# Patient Record
Sex: FEMALE | Race: WHITE | Employment: OTHER | URBAN - NONMETROPOLITAN AREA
[De-identification: names, ages, dates, MRNs, and addresses within clinical notes are randomized per-mention and may not be internally consistent; named-entity substitution may affect disease eponyms.]

---

## 2020-11-03 RX ORDER — LEVOTHYROXINE SODIUM 0.05 MG/1
50 TABLET ORAL DAILY
COMMUNITY

## 2020-11-03 RX ORDER — MELOXICAM 7.5 MG/1
7.5 TABLET ORAL DAILY
COMMUNITY

## 2020-11-03 RX ORDER — CILOSTAZOL 50 MG/1
50 TABLET ORAL DAILY
COMMUNITY

## 2020-11-03 RX ORDER — ALPRAZOLAM 0.25 MG/1
0.25 TABLET ORAL NIGHTLY PRN
COMMUNITY

## 2020-11-03 RX ORDER — CEPHALEXIN 500 MG/1
500 CAPSULE ORAL 4 TIMES DAILY
COMMUNITY

## 2020-11-03 RX ORDER — CARVEDILOL 6.25 MG/1
6.25 TABLET ORAL 2 TIMES DAILY WITH MEALS
COMMUNITY

## 2020-11-03 RX ORDER — AMLODIPINE BESYLATE 10 MG/1
10 TABLET ORAL DAILY
COMMUNITY

## 2020-11-03 RX ORDER — DOCUSATE SODIUM 100 MG/1
100 CAPSULE, LIQUID FILLED ORAL NIGHTLY
COMMUNITY

## 2020-11-03 RX ORDER — GABAPENTIN 100 MG/1
100 CAPSULE ORAL 3 TIMES DAILY
COMMUNITY

## 2020-11-03 RX ORDER — ATORVASTATIN CALCIUM 80 MG/1
80 TABLET, FILM COATED ORAL NIGHTLY
COMMUNITY

## 2020-11-03 RX ORDER — OXYCODONE HYDROCHLORIDE AND ACETAMINOPHEN 5; 325 MG/1; MG/1
1 TABLET ORAL EVERY 6 HOURS PRN
Status: ON HOLD | COMMUNITY
End: 2020-11-18 | Stop reason: HOSPADM

## 2020-11-03 RX ORDER — MULTIVIT WITH MINERALS/LUTEIN
1000 TABLET ORAL DAILY
COMMUNITY

## 2020-11-03 RX ORDER — ONDANSETRON 4 MG/1
4 TABLET, FILM COATED ORAL EVERY 8 HOURS PRN
COMMUNITY

## 2020-11-03 RX ORDER — CILOSTAZOL 50 MG/1
50 TABLET ORAL DAILY
COMMUNITY
End: 2020-11-03 | Stop reason: ALTCHOICE

## 2020-11-04 NOTE — PROGRESS NOTES
Message received from [place] at Dr. Raad Mendoza office to have the pr hold Pletal x 3 days prior to surgery per Dr. Korey Roldan. Instructions faxed to [facility].

## 2020-11-10 ENCOUNTER — HOSPITAL ENCOUNTER (OUTPATIENT)
Dept: PREADMISSION TESTING | Age: 85
Setting detail: SPECIMEN
Discharge: HOME | End: 2020-11-14
Payer: MEDICARE

## 2020-11-10 PROCEDURE — C9803 HOPD COVID-19 SPEC COLLECT: HCPCS

## 2020-11-10 PROCEDURE — U0003 INFECTIOUS AGENT DETECTION BY NUCLEIC ACID (DNA OR RNA); SEVERE ACUTE RESPIRATORY SYNDROME CORONAVIRUS 2 (SARS-COV-2) (CORONAVIRUS DISEASE [COVID-19]), AMPLIFIED PROBE TECHNIQUE, MAKING USE OF HIGH THROUGHPUT TECHNOLOGIES AS DESCRIBED BY CMS-2020-01-R: HCPCS

## 2020-11-11 LAB
SARS-COV-2, RAPID: NORMAL
SARS-COV-2: NORMAL
SARS-COV-2: NOT DETECTED
SOURCE: NORMAL

## 2020-11-16 ENCOUNTER — ANESTHESIA EVENT (OUTPATIENT)
Dept: OPERATING ROOM | Age: 85
DRG: 470 | End: 2020-11-16
Payer: MEDICARE

## 2020-11-17 ENCOUNTER — APPOINTMENT (OUTPATIENT)
Dept: GENERAL RADIOLOGY | Age: 85
DRG: 470 | End: 2020-11-17
Attending: ORTHOPAEDIC SURGERY
Payer: MEDICARE

## 2020-11-17 ENCOUNTER — ANESTHESIA (OUTPATIENT)
Dept: OPERATING ROOM | Age: 85
DRG: 470 | End: 2020-11-17
Payer: MEDICARE

## 2020-11-17 ENCOUNTER — HOSPITAL ENCOUNTER (INPATIENT)
Age: 85
LOS: 1 days | Discharge: NURSING, REHAB OR LONG-TERM CARE | DRG: 470 | End: 2020-11-18
Attending: ORTHOPAEDIC SURGERY | Admitting: ORTHOPAEDIC SURGERY
Payer: MEDICARE

## 2020-11-17 VITALS — DIASTOLIC BLOOD PRESSURE: 56 MMHG | SYSTOLIC BLOOD PRESSURE: 146 MMHG | OXYGEN SATURATION: 99 %

## 2020-11-17 PROBLEM — M16.11 PRIMARY OSTEOARTHRITIS OF RIGHT HIP: Status: ACTIVE | Noted: 2020-11-17

## 2020-11-17 PROCEDURE — 3600000015 HC SURGERY LEVEL 5 ADDTL 15MIN: Performed by: ORTHOPAEDIC SURGERY

## 2020-11-17 PROCEDURE — 3700000001 HC ADD 15 MINUTES (ANESTHESIA): Performed by: ORTHOPAEDIC SURGERY

## 2020-11-17 PROCEDURE — 2580000003 HC RX 258: Performed by: ORTHOPAEDIC SURGERY

## 2020-11-17 PROCEDURE — 88311 DECALCIFY TISSUE: CPT

## 2020-11-17 PROCEDURE — 97166 OT EVAL MOD COMPLEX 45 MIN: CPT

## 2020-11-17 PROCEDURE — 0SR90JZ REPLACEMENT OF RIGHT HIP JOINT WITH SYNTHETIC SUBSTITUTE, OPEN APPROACH: ICD-10-PCS | Performed by: ORTHOPAEDIC SURGERY

## 2020-11-17 PROCEDURE — 2709999900 HC NON-CHARGEABLE SUPPLY: Performed by: ORTHOPAEDIC SURGERY

## 2020-11-17 PROCEDURE — 2500000003 HC RX 250 WO HCPCS: Performed by: NURSE ANESTHETIST, CERTIFIED REGISTERED

## 2020-11-17 PROCEDURE — 97162 PT EVAL MOD COMPLEX 30 MIN: CPT

## 2020-11-17 PROCEDURE — 2500000003 HC RX 250 WO HCPCS: Performed by: ORTHOPAEDIC SURGERY

## 2020-11-17 PROCEDURE — 1200000000 HC SEMI PRIVATE

## 2020-11-17 PROCEDURE — 6360000002 HC RX W HCPCS: Performed by: NURSE ANESTHETIST, CERTIFIED REGISTERED

## 2020-11-17 PROCEDURE — 97530 THERAPEUTIC ACTIVITIES: CPT

## 2020-11-17 PROCEDURE — 6360000002 HC RX W HCPCS: Performed by: ORTHOPAEDIC SURGERY

## 2020-11-17 PROCEDURE — 3700000000 HC ANESTHESIA ATTENDED CARE: Performed by: ORTHOPAEDIC SURGERY

## 2020-11-17 PROCEDURE — 6370000000 HC RX 637 (ALT 250 FOR IP): Performed by: ORTHOPAEDIC SURGERY

## 2020-11-17 PROCEDURE — P9046 ALBUMIN (HUMAN), 25%, 20 ML: HCPCS | Performed by: NURSE ANESTHETIST, CERTIFIED REGISTERED

## 2020-11-17 PROCEDURE — 3600000005 HC SURGERY LEVEL 5 BASE: Performed by: ORTHOPAEDIC SURGERY

## 2020-11-17 PROCEDURE — 7100000001 HC PACU RECOVERY - ADDTL 15 MIN: Performed by: ORTHOPAEDIC SURGERY

## 2020-11-17 PROCEDURE — 7100000000 HC PACU RECOVERY - FIRST 15 MIN: Performed by: ORTHOPAEDIC SURGERY

## 2020-11-17 PROCEDURE — 88304 TISSUE EXAM BY PATHOLOGIST: CPT

## 2020-11-17 PROCEDURE — C1776 JOINT DEVICE (IMPLANTABLE): HCPCS | Performed by: ORTHOPAEDIC SURGERY

## 2020-11-17 PROCEDURE — 73501 X-RAY EXAM HIP UNI 1 VIEW: CPT

## 2020-11-17 DEVICE — ACETABULAR SHELL Ø52 TWO HOLES
Type: IMPLANTABLE DEVICE | Site: HIP | Status: FUNCTIONAL
Brand: MPACT ACETABULAR SYSTEM

## 2020-11-17 DEVICE — FEMORAL HEAD Ø 36 SIZE M
Type: IMPLANTABLE DEVICE | Site: HIP | Status: FUNCTIONAL
Brand: COCR FEMORAL BALL HEAD

## 2020-11-17 DEVICE — MASTERLOC CEMENTLESS TI COATED LAT STEM # 6
Type: IMPLANTABLE DEVICE | Site: HIP | Status: FUNCTIONAL
Brand: MASTERLOC FEMORAL STEMS

## 2020-11-17 DEVICE — FLAT PE  HC LINER Ø 36 / E
Type: IMPLANTABLE DEVICE | Site: HIP | Status: FUNCTIONAL
Brand: MPACT ACETABULAR SYSTEM

## 2020-11-17 RX ORDER — CILOSTAZOL 50 MG/1
50 TABLET ORAL DAILY
Status: DISCONTINUED | OUTPATIENT
Start: 2020-11-18 | End: 2020-11-18 | Stop reason: HOSPADM

## 2020-11-17 RX ORDER — POTASSIUM CHLORIDE 750 MG/1
20 TABLET, EXTENDED RELEASE ORAL 2 TIMES DAILY
Status: DISCONTINUED | OUTPATIENT
Start: 2020-11-17 | End: 2020-11-18 | Stop reason: HOSPADM

## 2020-11-17 RX ORDER — LEVOTHYROXINE SODIUM 0.05 MG/1
50 TABLET ORAL DAILY
Status: DISCONTINUED | OUTPATIENT
Start: 2020-11-18 | End: 2020-11-18 | Stop reason: HOSPADM

## 2020-11-17 RX ORDER — CARVEDILOL 6.25 MG/1
6.25 TABLET ORAL 2 TIMES DAILY WITH MEALS
Status: DISCONTINUED | OUTPATIENT
Start: 2020-11-17 | End: 2020-11-18 | Stop reason: HOSPADM

## 2020-11-17 RX ORDER — CEFAZOLIN SODIUM 1 G/3ML
INJECTION, POWDER, FOR SOLUTION INTRAMUSCULAR; INTRAVENOUS PRN
Status: DISCONTINUED | OUTPATIENT
Start: 2020-11-17 | End: 2020-11-17 | Stop reason: ALTCHOICE

## 2020-11-17 RX ORDER — ACETAMINOPHEN 325 MG/1
650 TABLET ORAL EVERY 6 HOURS
Status: DISCONTINUED | OUTPATIENT
Start: 2020-11-17 | End: 2020-11-18 | Stop reason: HOSPADM

## 2020-11-17 RX ORDER — ONDANSETRON 2 MG/ML
INJECTION INTRAMUSCULAR; INTRAVENOUS PRN
Status: DISCONTINUED | OUTPATIENT
Start: 2020-11-17 | End: 2020-11-17 | Stop reason: SDUPTHER

## 2020-11-17 RX ORDER — FENTANYL CITRATE 50 UG/ML
INJECTION, SOLUTION INTRAMUSCULAR; INTRAVENOUS PRN
Status: DISCONTINUED | OUTPATIENT
Start: 2020-11-17 | End: 2020-11-17 | Stop reason: SDUPTHER

## 2020-11-17 RX ORDER — PROPOFOL 10 MG/ML
INJECTION, EMULSION INTRAVENOUS PRN
Status: DISCONTINUED | OUTPATIENT
Start: 2020-11-17 | End: 2020-11-17 | Stop reason: SDUPTHER

## 2020-11-17 RX ORDER — SODIUM CHLORIDE 0.9 % (FLUSH) 0.9 %
10 SYRINGE (ML) INJECTION EVERY 12 HOURS SCHEDULED
Status: DISCONTINUED | OUTPATIENT
Start: 2020-11-17 | End: 2020-11-18 | Stop reason: HOSPADM

## 2020-11-17 RX ORDER — SODIUM CHLORIDE 0.9 % (FLUSH) 0.9 %
10 SYRINGE (ML) INJECTION EVERY 12 HOURS SCHEDULED
Status: DISCONTINUED | OUTPATIENT
Start: 2020-11-17 | End: 2020-11-17 | Stop reason: HOSPADM

## 2020-11-17 RX ORDER — MORPHINE SULFATE 2 MG/ML
2 INJECTION, SOLUTION INTRAMUSCULAR; INTRAVENOUS
Status: DISCONTINUED | OUTPATIENT
Start: 2020-11-17 | End: 2020-11-18 | Stop reason: HOSPADM

## 2020-11-17 RX ORDER — GABAPENTIN 100 MG/1
100 CAPSULE ORAL 3 TIMES DAILY
Status: DISCONTINUED | OUTPATIENT
Start: 2020-11-17 | End: 2020-11-18 | Stop reason: HOSPADM

## 2020-11-17 RX ORDER — BUPIVACAINE/KETOROLAC/KETAMINE 150-60/50
SYRINGE (ML) INJECTION PRN
Status: DISCONTINUED | OUTPATIENT
Start: 2020-11-17 | End: 2020-11-17 | Stop reason: ALTCHOICE

## 2020-11-17 RX ORDER — CLINDAMYCIN PHOSPHATE 900 MG/50ML
900 INJECTION INTRAVENOUS EVERY 8 HOURS
Status: COMPLETED | OUTPATIENT
Start: 2020-11-17 | End: 2020-11-18

## 2020-11-17 RX ORDER — DIMENHYDRINATE 50 MG/1
50 TABLET ORAL ONCE
Status: COMPLETED | OUTPATIENT
Start: 2020-11-17 | End: 2020-11-17

## 2020-11-17 RX ORDER — DOCUSATE SODIUM 100 MG/1
100 CAPSULE, LIQUID FILLED ORAL NIGHTLY
Status: DISCONTINUED | OUTPATIENT
Start: 2020-11-17 | End: 2020-11-18 | Stop reason: HOSPADM

## 2020-11-17 RX ORDER — MELOXICAM 7.5 MG/1
7.5 TABLET ORAL DAILY
Status: DISCONTINUED | OUTPATIENT
Start: 2020-11-18 | End: 2020-11-18 | Stop reason: HOSPADM

## 2020-11-17 RX ORDER — ONDANSETRON 2 MG/ML
4 INJECTION INTRAMUSCULAR; INTRAVENOUS
Status: DISCONTINUED | OUTPATIENT
Start: 2020-11-17 | End: 2020-11-17 | Stop reason: HOSPADM

## 2020-11-17 RX ORDER — CLINDAMYCIN PHOSPHATE 900 MG/50ML
900 INJECTION INTRAVENOUS ONCE
Status: COMPLETED | OUTPATIENT
Start: 2020-11-17 | End: 2020-11-17

## 2020-11-17 RX ORDER — SODIUM CHLORIDE 9 MG/ML
INJECTION, SOLUTION INTRAVENOUS CONTINUOUS
Status: DISCONTINUED | OUTPATIENT
Start: 2020-11-17 | End: 2020-11-18 | Stop reason: HOSPADM

## 2020-11-17 RX ORDER — SODIUM CHLORIDE 0.9 % (FLUSH) 0.9 %
10 SYRINGE (ML) INJECTION PRN
Status: DISCONTINUED | OUTPATIENT
Start: 2020-11-17 | End: 2020-11-17 | Stop reason: HOSPADM

## 2020-11-17 RX ORDER — SODIUM CHLORIDE, SODIUM LACTATE, POTASSIUM CHLORIDE, CALCIUM CHLORIDE 600; 310; 30; 20 MG/100ML; MG/100ML; MG/100ML; MG/100ML
INJECTION, SOLUTION INTRAVENOUS CONTINUOUS
Status: DISCONTINUED | OUTPATIENT
Start: 2020-11-17 | End: 2020-11-17 | Stop reason: SDUPTHER

## 2020-11-17 RX ORDER — ALPRAZOLAM 0.25 MG/1
0.25 TABLET ORAL NIGHTLY PRN
Status: DISCONTINUED | OUTPATIENT
Start: 2020-11-17 | End: 2020-11-18 | Stop reason: HOSPADM

## 2020-11-17 RX ORDER — AMLODIPINE BESYLATE 10 MG/1
10 TABLET ORAL DAILY
Status: DISCONTINUED | OUTPATIENT
Start: 2020-11-18 | End: 2020-11-18

## 2020-11-17 RX ORDER — SODIUM CHLORIDE, SODIUM LACTATE, POTASSIUM CHLORIDE, CALCIUM CHLORIDE 600; 310; 30; 20 MG/100ML; MG/100ML; MG/100ML; MG/100ML
INJECTION, SOLUTION INTRAVENOUS CONTINUOUS
Status: DISCONTINUED | OUTPATIENT
Start: 2020-11-17 | End: 2020-11-17

## 2020-11-17 RX ORDER — ALBUMIN (HUMAN) 12.5 G/50ML
SOLUTION INTRAVENOUS PRN
Status: DISCONTINUED | OUTPATIENT
Start: 2020-11-17 | End: 2020-11-17 | Stop reason: SDUPTHER

## 2020-11-17 RX ORDER — MORPHINE SULFATE 4 MG/ML
4 INJECTION, SOLUTION INTRAMUSCULAR; INTRAVENOUS
Status: DISCONTINUED | OUTPATIENT
Start: 2020-11-17 | End: 2020-11-18 | Stop reason: HOSPADM

## 2020-11-17 RX ORDER — TRAMADOL HYDROCHLORIDE 50 MG/1
100 TABLET ORAL EVERY 6 HOURS PRN
Status: DISCONTINUED | OUTPATIENT
Start: 2020-11-17 | End: 2020-11-18 | Stop reason: HOSPADM

## 2020-11-17 RX ORDER — DEXAMETHASONE SODIUM PHOSPHATE 4 MG/ML
INJECTION, SOLUTION INTRA-ARTICULAR; INTRALESIONAL; INTRAMUSCULAR; INTRAVENOUS; SOFT TISSUE PRN
Status: DISCONTINUED | OUTPATIENT
Start: 2020-11-17 | End: 2020-11-17 | Stop reason: SDUPTHER

## 2020-11-17 RX ORDER — FUROSEMIDE 40 MG/1
40 TABLET ORAL 2 TIMES DAILY
Status: DISCONTINUED | OUTPATIENT
Start: 2020-11-17 | End: 2020-11-18 | Stop reason: HOSPADM

## 2020-11-17 RX ORDER — ACETAMINOPHEN 325 MG/1
650 TABLET ORAL ONCE
Status: COMPLETED | OUTPATIENT
Start: 2020-11-17 | End: 2020-11-17

## 2020-11-17 RX ORDER — METOCLOPRAMIDE HYDROCHLORIDE 5 MG/ML
INJECTION INTRAMUSCULAR; INTRAVENOUS PRN
Status: DISCONTINUED | OUTPATIENT
Start: 2020-11-17 | End: 2020-11-17 | Stop reason: SDUPTHER

## 2020-11-17 RX ORDER — TRAMADOL HYDROCHLORIDE 50 MG/1
50 TABLET ORAL EVERY 6 HOURS PRN
Status: DISCONTINUED | OUTPATIENT
Start: 2020-11-17 | End: 2020-11-18 | Stop reason: HOSPADM

## 2020-11-17 RX ORDER — FENTANYL CITRATE 50 UG/ML
25 INJECTION, SOLUTION INTRAMUSCULAR; INTRAVENOUS EVERY 5 MIN PRN
Status: DISCONTINUED | OUTPATIENT
Start: 2020-11-17 | End: 2020-11-17 | Stop reason: HOSPADM

## 2020-11-17 RX ORDER — BUPIVACAINE/KETOROLAC/KETAMINE 150-60/50
SYRINGE (ML) INJECTION
Status: DISCONTINUED
Start: 2020-11-17 | End: 2020-11-17

## 2020-11-17 RX ORDER — ATORVASTATIN CALCIUM 40 MG/1
80 TABLET, FILM COATED ORAL NIGHTLY
Status: DISCONTINUED | OUTPATIENT
Start: 2020-11-17 | End: 2020-11-18 | Stop reason: HOSPADM

## 2020-11-17 RX ORDER — ONDANSETRON 4 MG/1
4 TABLET, FILM COATED ORAL EVERY 8 HOURS PRN
Status: DISCONTINUED | OUTPATIENT
Start: 2020-11-17 | End: 2020-11-18 | Stop reason: HOSPADM

## 2020-11-17 RX ORDER — LIDOCAINE HYDROCHLORIDE 20 MG/ML
INJECTION, SOLUTION EPIDURAL; INFILTRATION; INTRACAUDAL; PERINEURAL PRN
Status: DISCONTINUED | OUTPATIENT
Start: 2020-11-17 | End: 2020-11-17 | Stop reason: SDUPTHER

## 2020-11-17 RX ORDER — FENOPROFEN CALCIUM 200 MG
CAPSULE ORAL 2 TIMES DAILY
COMMUNITY

## 2020-11-17 RX ORDER — ASCORBIC ACID 500 MG
1000 TABLET ORAL DAILY
Status: DISCONTINUED | OUTPATIENT
Start: 2020-11-18 | End: 2020-11-18 | Stop reason: HOSPADM

## 2020-11-17 RX ORDER — SODIUM CHLORIDE 0.9 % (FLUSH) 0.9 %
10 SYRINGE (ML) INJECTION PRN
Status: DISCONTINUED | OUTPATIENT
Start: 2020-11-17 | End: 2020-11-18 | Stop reason: HOSPADM

## 2020-11-17 RX ORDER — FUROSEMIDE 40 MG/1
40 TABLET ORAL 2 TIMES DAILY
COMMUNITY

## 2020-11-17 RX ORDER — POTASSIUM CHLORIDE 1.5 G/1.77G
20 POWDER, FOR SOLUTION ORAL 2 TIMES DAILY
COMMUNITY

## 2020-11-17 RX ADMIN — ALPRAZOLAM 0.25 MG: 0.25 TABLET ORAL at 20:20

## 2020-11-17 RX ADMIN — ATORVASTATIN CALCIUM 80 MG: 40 TABLET, FILM COATED ORAL at 20:20

## 2020-11-17 RX ADMIN — GABAPENTIN 100 MG: 100 CAPSULE ORAL at 13:21

## 2020-11-17 RX ADMIN — METOCLOPRAMIDE 10 MG: 5 INJECTION, SOLUTION INTRAMUSCULAR; INTRAVENOUS at 10:07

## 2020-11-17 RX ADMIN — TRAMADOL HYDROCHLORIDE 100 MG: 50 TABLET, COATED ORAL at 23:50

## 2020-11-17 RX ADMIN — GABAPENTIN 100 MG: 100 CAPSULE ORAL at 20:20

## 2020-11-17 RX ADMIN — ONDANSETRON 4 MG: 2 INJECTION INTRAMUSCULAR; INTRAVENOUS at 10:27

## 2020-11-17 RX ADMIN — PROPOFOL 50 MG: 10 INJECTION, EMULSION INTRAVENOUS at 09:59

## 2020-11-17 RX ADMIN — FENTANYL CITRATE 25 MCG: 50 INJECTION, SOLUTION INTRAMUSCULAR; INTRAVENOUS at 10:26

## 2020-11-17 RX ADMIN — SODIUM CHLORIDE, POTASSIUM CHLORIDE, SODIUM LACTATE AND CALCIUM CHLORIDE: 600; 310; 30; 20 INJECTION, SOLUTION INTRAVENOUS at 07:58

## 2020-11-17 RX ADMIN — ACETAMINOPHEN 650 MG: 325 TABLET, FILM COATED ORAL at 07:55

## 2020-11-17 RX ADMIN — FENTANYL CITRATE 25 MCG: 50 INJECTION, SOLUTION INTRAMUSCULAR; INTRAVENOUS at 11:18

## 2020-11-17 RX ADMIN — ALBUMIN HUMAN 25 G: 0.25 SOLUTION INTRAVENOUS at 11:33

## 2020-11-17 RX ADMIN — PROPOFOL 110 MG: 10 INJECTION, EMULSION INTRAVENOUS at 09:44

## 2020-11-17 RX ADMIN — PHENYLEPHRINE HYDROCHLORIDE 50 MCG: 10 INJECTION INTRAVENOUS at 11:39

## 2020-11-17 RX ADMIN — SODIUM CHLORIDE: 9 INJECTION, SOLUTION INTRAVENOUS at 23:41

## 2020-11-17 RX ADMIN — DIMENHYDRINATE 50 MG: 50 TABLET ORAL at 07:55

## 2020-11-17 RX ADMIN — CLINDAMYCIN PHOSPHATE 900 MG: 900 INJECTION, SOLUTION INTRAVENOUS at 16:21

## 2020-11-17 RX ADMIN — FENTANYL CITRATE 25 MCG: 50 INJECTION, SOLUTION INTRAMUSCULAR; INTRAVENOUS at 10:14

## 2020-11-17 RX ADMIN — FENTANYL CITRATE 25 MCG: 50 INJECTION, SOLUTION INTRAMUSCULAR; INTRAVENOUS at 10:28

## 2020-11-17 RX ADMIN — FENTANYL CITRATE 25 MCG: 50 INJECTION, SOLUTION INTRAMUSCULAR; INTRAVENOUS at 09:43

## 2020-11-17 RX ADMIN — ACETAMINOPHEN 650 MG: 325 TABLET, FILM COATED ORAL at 20:20

## 2020-11-17 RX ADMIN — POTASSIUM CHLORIDE 20 MEQ: 750 TABLET, EXTENDED RELEASE ORAL at 20:20

## 2020-11-17 RX ADMIN — LIDOCAINE HYDROCHLORIDE 60 MG: 20 INJECTION, SOLUTION EPIDURAL; INFILTRATION; INTRACAUDAL; PERINEURAL at 09:44

## 2020-11-17 RX ADMIN — SODIUM CHLORIDE: 9 INJECTION, SOLUTION INTRAVENOUS at 13:13

## 2020-11-17 RX ADMIN — DEXAMETHASONE SODIUM PHOSPHATE 8 MG: 4 INJECTION, SOLUTION INTRAMUSCULAR; INTRAVENOUS at 10:27

## 2020-11-17 RX ADMIN — CLINDAMYCIN PHOSPHATE 900 MG: 900 INJECTION, SOLUTION INTRAVENOUS at 09:30

## 2020-11-17 RX ADMIN — ACETAMINOPHEN 650 MG: 325 TABLET, FILM COATED ORAL at 13:21

## 2020-11-17 RX ADMIN — SODIUM CHLORIDE, POTASSIUM CHLORIDE, SODIUM LACTATE AND CALCIUM CHLORIDE: 600; 310; 30; 20 INJECTION, SOLUTION INTRAVENOUS at 11:37

## 2020-11-17 RX ADMIN — FENTANYL CITRATE 25 MCG: 50 INJECTION, SOLUTION INTRAMUSCULAR; INTRAVENOUS at 10:06

## 2020-11-17 ASSESSMENT — PAIN SCALES - GENERAL
PAINLEVEL_OUTOF10: 8
PAINLEVEL_OUTOF10: 0
PAINLEVEL_OUTOF10: 4
PAINLEVEL_OUTOF10: 7
PAINLEVEL_OUTOF10: 10

## 2020-11-17 ASSESSMENT — PAIN DESCRIPTION - ORIENTATION
ORIENTATION: RIGHT
ORIENTATION: RIGHT

## 2020-11-17 ASSESSMENT — LIFESTYLE VARIABLES: SMOKING_STATUS: 0

## 2020-11-17 ASSESSMENT — PAIN - FUNCTIONAL ASSESSMENT
PAIN_FUNCTIONAL_ASSESSMENT: 0-10
PAIN_FUNCTIONAL_ASSESSMENT: ACTIVITIES ARE NOT PREVENTED

## 2020-11-17 ASSESSMENT — PAIN DESCRIPTION - DESCRIPTORS
DESCRIPTORS: SORE
DESCRIPTORS: ACHING

## 2020-11-17 ASSESSMENT — PAIN DESCRIPTION - PAIN TYPE
TYPE: SURGICAL PAIN
TYPE: SURGICAL PAIN

## 2020-11-17 ASSESSMENT — PAIN DESCRIPTION - LOCATION
LOCATION: LEG
LOCATION: HIP

## 2020-11-17 ASSESSMENT — PAIN DESCRIPTION - FREQUENCY: FREQUENCY: CONTINUOUS

## 2020-11-17 NOTE — PROGRESS NOTES
Report given to Loring Dubin, RN to give to [facility] RN when available on the floor

## 2020-11-17 NOTE — ANESTHESIA PRE PROCEDURE
meloxicam (MOBIC) 7.5 MG tablet Take 7.5 mg by mouth daily   Yes Historical Provider, MD   cilostazol (PLETAL) 50 MG tablet Take 50 mg by mouth daily   Yes Historical Provider, MD   hydrocortisone 1 % lotion Apply topically 2 times daily Apply topically 2 times daily. Historical Provider, MD       Current medications:    Current Facility-Administered Medications   Medication Dose Route Frequency Provider Last Rate Last Dose    clindamycin (CLEOCIN) 900 mg in dextrose 5 % 50 mL IVPB  900 mg Intravenous Once Shin Pittman MD        lactated ringers infusion   Intravenous Continuous Shin Pittman  mL/hr at 11/17/20 0758      sodium chloride flush 0.9 % injection 10 mL  10 mL Intravenous 2 times per day Shin Pittman MD        sodium chloride flush 0.9 % injection 10 mL  10 mL Intravenous PRN Shin Pittman MD           Allergies: Allergies   Allergen Reactions    Niacin And Related     Pcn [Penicillins]        Problem List:  There is no problem list on file for this patient.       Past Medical History:        Diagnosis Date    Anxiety     Arthritis     Heart attack ([address]     Heart disease     Hyperlipidemia     Hypertension     Radiculopathy     Thyroid disease        Past Surgical History:        Procedure Laterality Date    APPENDECTOMY      CATARACT REMOVAL Bilateral     COLONOSCOPY      ENDOSCOPY, COLON, DIAGNOSTIC      HERNIA REPAIR      HYSTERECTOMY      OVARY REMOVAL Bilateral     TONSILLECTOMY         Social History:    Social History     Tobacco Use    Smoking status: Never Smoker    Smokeless tobacco: Never Used   Substance Use Topics    Alcohol use: Not Currently                                Counseling given: Not Answered      Vital Signs (Current):   Vitals:    11/03/20 0814 11/17/20 0736   BP:  (!) 167/50   Pulse:  74   Resp:  16   Temp:  36.4 °C (97.6 °F)   TempSrc:  Temporal   SpO2:  98%   Weight: 152 lb (68.9 kg) 155 lb (70.3 kg)   Height: 5' 3\" (1.6 m) 5' 3\" (1.6 m) BP Readings from Last 3 Encounters:   11/17/20 (!) 167/50       NPO Status: Time of last liquid consumption: 1730                        Time of last solid consumption: 1730                        Date of last liquid consumption: 11/16/20                        Date of last solid food consumption: 11/16/20    BMI:   Wt Readings from Last 3 Encounters:   11/17/20 155 lb (70.3 kg)     Body mass index is 27.46 kg/m². CBC: No results found for: WBC, RBC, HGB, HCT, MCV, RDW, PLT    CMP: No results found for: NA, K, CL, CO2, BUN, CREATININE, GFRAA, AGRATIO, LABGLOM, GLUCOSE, PROT, CALCIUM, BILITOT, ALKPHOS, AST, ALT    POC Tests: No results for input(s): POCGLU, POCNA, POCK, POCCL, POCBUN, POCHEMO, POCHCT in the last 72 hours. Coags: No results found for: PROTIME, INR, APTT    HCG (If Applicable): No results found for: PREGTESTUR, PREGSERUM, HCG, HCGQUANT     ABGs: No results found for: PHART, PO2ART, EUG8GVM, GMB6YRK, BEART, [id number]     Type & Screen (If Applicable):  No results found for: LABABO, LABRH    Drug/Infectious Status (If Applicable):  No results found for: HIV, HEPCAB    COVID-19 Screening (If Applicable):   Lab Results   Component Value Date    COVID19 Not Detected 11/10/2020         Anesthesia Evaluation   no history of anesthetic complications:   Airway: Mallampati: III  TM distance: <3 FB   Neck ROM: full  Mouth opening: > = 3 FB Dental:    (+) partials      Pulmonary:normal exam        (-) recent URI and not a current smoker                           Cardiovascular:  Exercise tolerance: poor (<4 METS),   (+) hypertension: moderate, CABG/stent: no interval change, murmur,                ROS comment: 10/2020 Stress test :  No ischemic ECG changes with Lexiscan    No diagnostic pattern for MI or ischemia on nuclear study    Normal LVEF and wall motion    Low risk study   10/2020 Cardiology  Not: IMPRESSIONS/PLAN  1.  Pre-operative clearance  - POCT EKG  I obtained 12 EKG which showed normal sinus rhythm no abnormality noted. Patient with history of coronary disease with remote stent LAD and diagonal branch who has done well without any angina but need to undergo right hip replacement I would favor obtaining a Lexiscan Cardiolite if that shows no major ischemia she will be at moderate risk not prohibitive. Paroxysmal atrial fibrillation remains sinus rhythm she is on Eliquis for stroke prophylaxis. Peripheral vascular disease. 8/2020 Vascular note: Abraham Son is here for annual surveillance of her carotids and lower extremity circulation. She denies stroke, TIAs, or amaurosis. She has had some pain along the anterior aspect of the right lower extremity along the shin is seems to be potentially an orthopedic issue. Recent testing demonstrates normal ABIs will well-maintained waveforms in the right lower extremity. She has a palpable dorsalis pedis pulse on that side. The left side demonstrates mildly diminished waveforms and an index of 0.87. I do not appreciate a palpable DP pulse on the left side but she does have a palpable PT pulse on the left side, it is not as strong as the right side. She denies stroke, TIAs, or amaurosis. Her carotid duplex demonstrates mild atherosclerotic changes bilaterally. I will see her back in a year with lower extremity and carotid testing just prior to that visit. I have encouraged her to see her orthopedic surgeon to see if there is something going on that would explain the discomfort in her right lower extremity which has her significantly immobilized.        Neuro/Psych:   (+) neuromuscular disease (Chronic pain and arthritis, chronic pain in left lower anterior thigh and occasionally lateral lower right extremity):,             GI/Hepatic/Renal:        (-) GERD       Endo/Other:    (+) hypothyroidism::., .                 Abdominal:           Vascular:                                        Anesthesia Plan      general     ASA 4     (Discussed R/B/A of SAB and GA, pt and son choose GA.  )  Induction: intravenous. MIPS: Postoperative opioids intended and Prophylactic antiemetics administered. Anesthetic plan and risks discussed with patient and child/children.                       [facility], TAMIKO - CRNA   11/17/2020

## 2020-11-17 NOTE — PROGRESS NOTES
Discharge Criteria    Inpatients must meet Criteria 1 through 7. All other patients are either YES or N/A. If a NO is chosen then Anesthesia or Surgeon must be notified. 1.  Minimum 30 minutes after last dose of sedative medication, minimum 120 minutes after last dose of reversal agent. Yes      2. Systolic BP stable within 20 mmHg for 30 minutes & systolic BP between 90 & 072 or within 10 mmHg of baseline. Yes      3. Pulse between 60 and 100 or within 10 bpm of baseline. Yes      4. Spontaneous respiratory rate >/= 10 per minute. Yes      5. SaO2 >/= 95 or  >/= baseline. Yes      6. Able to cough and swallow or return to baseline function. Yes      7. Alert and oriented or return to baseline mental status. Yes      8. Demonstrates controlled, coordinated movements, ambulates with steady gait, or return to baseline activity function. N/A      9. Minimal or no pain or nausea, or at a level tolerable and acceptable to patient. N/A      10. Takes and retains oral fluids as allowed. N/A      11. Procedural / perioperative site stable. Minimal or no bleeding. N/A          12. If GI endoscopy procedure, minimal or no abdominal distention or passing flatus. N/A      13. Written discharge instructions and emergency telephone number provided. N/A      14. Accompanied by a responsible adult.     N/A

## 2020-11-17 NOTE — PROGRESS NOTES
Department of Orthopedic Surgery  Progress Note    Subjective:  Patient feels a little woozy after surgery. Pain is perceived as moderate (4-6 pain scale)    Vitals  VITALS:  BP (!) 125/50   Pulse 75   Temp 98 °F (36.7 °C) (Temporal)   Resp 14   Ht 5' 3\" (1.6 m)   Wt 155 lb (70.3 kg)   SpO2 97%   BMI 27.46 kg/m²     PHYSICAL EXAM:  General: in no apparent distress, alert and oriented times 3  Right Lower Extremity  Incision:  dressing in place, clean, dry and intact  Neurologic:  Moving lower extremity as appropriate following sugery. Able to dorsiflex and plantar flex foot/ankle. Intact to gross sensation and touch in lower extremity. Vascular: present 1+ lower extremity. Calf soft, non-tender. Abnormal Exam findings:  none      ASSESSMENT AND PLAN:  Post operative day 0 status post right total hip arthroplasty.     1:  Weight bearing as tolerated  2:  Continue Deep venous thrombosis prophylaxis - TRINO/SCD/Eliquis  3:  Continue physical therapy  4:  D/C Plan:  Back to [facility] in [place]  5:  Continue Pain Control

## 2020-11-17 NOTE — ANESTHESIA POSTPROCEDURE EVALUATION
Department of Anesthesiology  Postprocedure Note    Patient: Tran Nguyen  MRN: [id number]  YOB: 1930  Date of evaluation: 11/17/2020  Time:  2:35 PM     Procedure Summary     Date:  11/17/20 Room / Location:  [address] [place] [address]    Anesthesia Start:  9955 Anesthesia Stop:  3668    Procedure:  HIP TOTAL ARTHROPLASTY ANTERIOR APPROACH (Right ) Diagnosis:  (OSTEONECROSIS RIGHT FEMUR OSTEOARTHRITIS HIP JOINT)    Surgeon:  Sydnee Xavier MD Responsible Provider:  TAMIKO Pineda CRNA    Anesthesia Type:  general ASA Status:  4          Anesthesia Type: general    Ralph Phase I: Ralph Score: 8    Ralph Phase II:      Last vitals: Reviewed and per EMR flowsheets.        Anesthesia Post Evaluation    Patient location during evaluation: PACU  Patient participation: complete - patient participated  Level of consciousness: sleepy but conscious  Airway patency: patent  Nausea & Vomiting: no vomiting and no nausea  Complications: no  Cardiovascular status: hemodynamically stable  Respiratory status: spontaneous ventilation and room air  Hydration status: stable

## 2020-11-17 NOTE — OP NOTE
Operative Note      Patient: Saundra Guerrero  YOB: 1930  MRN: [id number]    DATE OF VISIT: 11/17/2020    PREOPERATIVE DIAGNOSIS:  1. Right hip primary osteoarthritis  2. Right femoral head osteonecrosisBody mass index is 27.46 kg/m². POSTOPERATIVE DIAGNOSIS:  Same    SURGEON: Bernice Banegas MD     ASSISTANT: Liz Benitez    OPERATION PERFORMED:   Right total hip arthroplasty - Anterior approach    ANESTHESTIST: CRNA: TAMIKO Crowley - CRNA; TAMIKO Diaz - CRNA    ANESTHESIA: general    ESTIMATED BLOOD LOSS: 200 mL    IMPLANTS:  Medacta Mpact 52 mm 2-hole acetabular cup with standard polyethylene for 36 mm head. Paresh Hallmark size 6 lateralized femoral stem with 36 mm + 0 mm cobalt chrome head    SPECIMEN: Femoral head     INDICATIONS: Patient presented with chronic hip pain secondary to arthritis. The patient failed to obtain meaningful relief despite nonoperative management. Given patient's persistent pain and disability, and difficulties with activities of daily living, wished to proceed with hip replacement surgery. Reviewed risks, complications, alternatives and benefits. The patient's questions were answered, and informed consent was obtained. DESCRIPTION OF PROCEDURE: The patient was identified in the pre-operative holding area. With the patient's agreement, the operative site was marked. Patient was taken to the operating room. Anesthesia was administed along with preoperative antibiotics. The patient was carefully padded and positioned in the supine fashion. The head and neck and upper extremities were carefully padded and positioned by anesthesia. Je hose and sequential stockings were placed on the nonoperative extremity. The nonoperative lower extremity was carefully padded and positioned in a well-leg jamison. The operative lower extremity was carefully padded and placed in the AMIS leg jamison.  The operative extremity was then prepped  and draped in routine, sterile fashion. Time out was taken to ensure the proper patient, operative site and procedure. An anterior hip incision was then made overlying the tensor fascia scarlet. Routine direct anterior approach was developed between the rectus femoris and the tensor fascia scarlet. The lateral circumflex vessels were isolated, tied and cauterized during the approach. Hemostasis was well-maintained. Hip capsule was identified and incised. The femoral neck cut was completed with soft tissue retractors in place. The femoral head was removed with advanced arthritic change noted. Exposure of the acetabulum was performed. The [facility] Charnley retractor was then positioned. Electrocautery was used to remove labral tissue, clearly identifying the bony rim, and remove soft tissue from the cotyloid fossa. I proceeded with sequential acetabular reaming with the reamer held in 45° abduction and 20° anteversion. There was good cancellous bleeding bone. Reaming was performed under fluoroscopic guidance. This was then followed by placement of the acetabular component which was then impacted into position as reamed. This was confirmed using fluoroscopic image. The cup was assessed and noted be well-seated and stable. This was followed by placement of the acetabular polyethylene liner. It was assessed and noted stable. Local anesthetic solution was injected into the capsule and soft tissues around the acetabulum. Attention was then turn towards the femur. Capsular releases were performed around the femur allowing careful exposure of the femur. Entrance was gained to the femoral canal using a box chisel and rasp. We then proceeded with sequential broaching to the appropriate size hip stem. The calcar was inspected and noted intact. Trial neck and heads were placed with restoration of leg length, confirmed with fluoroscopy. Patient was noted to have excellent stability in extension and external rotation.   The hip was dislocated, and the femoral component was removed. This was followed by placement of the hip stem which was impacted into position. There was no change in vertical or rotational stability. Fluoro confirmed leg lengths. The femoral neck was irrigated using antibiotic irrigation solution and dried. The head was then impacted and was stable. The wound was thoroughly irrigated using antibiotic irrigation solution and suctioned. The acetabulum was noted free of soft tissue and bony debris. The hip was reduced. There was no change in leg length or stability on range of motion testing. Final fluoroscopic images were obtained and compared to initial images and the contralateral leg revealing restoration of offset and leg length. Hemostasis was confirmed. Additional local anesthetic solution was injected into the anterior capsule and subcutaneous tissue. The fascia was repaired using #2 barbed monofilament suture. Subcutaneous layer was closed with 2-0 Vicryl suture, and the skin was closed with staples. Sterile dressing was applied. Drapes were removed. Patient was awoken, removed from the operating room table, and taken to recovery room in stable condition. Patient had palpable DP pulse. Signed not reviewed detail    Specimens:   ID Type Source Tests Collected by Time Destination   A :  Bone Joint, Hip SURGICAL PATHOLOGY Camelia Eastman MD 11/17/2020 1129        Implants:  Implant Name Type Inv.  Item Serial No.  Lot No. LRB No. Used Action   SHELL ACET [id number] LNR SZ E 2 H MPACT  SHELL ACET [id number] LNR SZ E 2 H MPACT  [employer] [facility] [id number] Right 1 Implanted   IMPL HIP ACETABULAR SHELL 50 2HL Hip IMPL HIP ACETABULAR SHELL 50 2HL  [employer] [email or web address] [id number] Right 1 Implanted   HEAD FEM M [id number] CO CHROM AMISTEM  HEAD FEM M [id number] CO CHROM AMISTEM  [employer] [facility] [id number] Right 1 Implanted   STEM FEM SZ 6 LAT HIP MECTAGRIP CEMENTLESS FLAT DBL TAPR  STEM FEM SZ 6 LAT HIP MECTAGRIP CEMENTLESS FLAT DBL

## 2020-11-17 NOTE — PROGRESS NOTES
Radiculopathy, and Thyroid disease. has a past surgical history that includes Tonsillectomy; Appendectomy; hernia repair; Colonoscopy; Endoscopy, colon, diagnostic; Hysterectomy; Cataract removal (Bilateral); Ovary removal (Bilateral); and Hip Arthroplasty (Right, 11/17/2020). Treatment Diagnosis: Muscle Weakness M62.81        Restrictions  Restrictions/Precautions  Restrictions/Precautions: Weight Bearing, General Precautions, Fall Risk  Lower Extremity Weight Bearing Restrictions  Right Lower Extremity Weight Bearing: Weight Bearing As Tolerated    Subjective   General  Chart Reviewed: Yes  Patient assessed for rehabilitation services?: Yes  Family / Caregiver Present: Yes (Son)  Referring Practitioner: Dr Galina Alonso  Diagnosis: R TC  Subjective  Subjective: Patient reports pain in RLE at this time but does not quantify  General Comment  Comments: Patient laying in bed upon OT arrival, agreeable to OT evaluation    Social/Functional History  Social/Functional History  Lives With: Alone  Type of Home: Condo  Home Layout: One level  Home Access: Level entry  Home Equipment: Rolling walker  Receives Help From: Personal care attendant  ADL Assistance: Independent  Homemaking Assistance: Needs assistance  Ambulation Assistance: Independent  Transfer Assistance: Independent  Active : Yes    Objective   Vision: Impaired  Vision Exceptions: Wears glasses at all times  Hearing: Exceptions to [facility]  Hearing Exceptions: Hard of hearing/hearing concerns    Orientation  Overall Orientation Status: Within Functional Limits  Balance  Sitting Balance: Minimal assistance  Standing Balance: Unable to assess due to low BP  ADL  Feeding: Independent  Grooming: Contact guard assistance  UE Bathing: Contact guard assistance;Minimal assistance  LE Bathing: Moderate assistance;Maximum assistance  UE Dressing: Contact guard assistance  LE Dressing: Moderate assistance;Maximum assistance  Toileting:  Moderate assistance  Tone RUTRISH  RUTRISH Tone: Normotonic  Tone LUE  LUE Tone: Normotonic  Coordination  Movements Are Fluid And Coordinated: Yes  Bed mobility  Rolling to Left: Moderate assistance  Rolling to Right: Moderate assistance  Supine to Sit: Moderate assistance  Sit to Supine: Moderate assistance  Scooting: Moderate assistance  Transfers  Stand Pivot Transfers: Unable to assess due to low BP  Sit to stand: Unable to assess due to low BP  Stand to sit: Unable to assess due to low BP  Cognition  Overall Cognitive Status: WFL  Perception  Overall Perceptual Status: WFL  Sensation  Overall Sensation Status: WFL  LUE AROM (degrees)  LUE AROM : WFL  RUE AROM (degrees)  RUE AROM : WFL  LUE Strength  Gross LUE Strength: WFL  RUE Strength  Gross RUE Strength: WFL    Plan   Plan  Times per week: 7  Times per day: Daily  Current Treatment Recommendations: Strengthening, Patient/Caregiver Education & Training, Balance Training, Functional Mobility Training, Endurance Training, Self-Care / ADL, Safety Education & Training  Plan Comment: ther ex, ther act, self care    AM-PAC Score  AM-[facility] Inpatient Daily Activity Raw Score: 16 (11/17/20 1510)  AM-PAC Inpatient ADL T-Scale Score : 35.96 (11/17/20 1510)  ADL Inpatient CMS 0-100% Score: 53.32 (11/17/20 1510)  ADL Inpatient CMS G-Code Modifier : CK (11/17/20 1510)    Goals  Short term goals  Time Frame for Short term goals: 10 days  Short term goal 1: Patient will perform UB ADLs with S/U using AE PRN in order to return to PLOF  Short term goal 2: Patient will perform LB ADLs with Pinky using AE PRN in order to return to PLOF  Short term goal 3: Patient will perform functional mobility during ADLs with Pinky using LRAD in order to return to PLOF  Short term goal 4: Patient will perform 3 minutes of standing sinkside ADLs without LOB or intolerable pain in order to increase participation with ADLs  Patient Goals   Patient goals:  To get better       Therapy Time   Individual Concurrent Group Co-treatment   Time In [address] [place] [address] Drive         Minutes 26         Timed Code Treatment Minutes: [address] , [place]

## 2020-11-17 NOTE — DISCHARGE INSTR - COC
Continuity of Care Form    Patient Name: Korey Martin   :  1930  MRN:  [id number]    Admit date:  2020  Discharge date:  2020    Code Status Order: Full Code   Advance Directives:   Advance Care Flowsheet Documentation     Date/Time Healthcare Directive Type of Healthcare Directive Copy in [address] Agent's Name Healthcare Agent's Phone Number    20 1419  Yes, patient has an advance directive for healthcare treatment  Durable power of  for health care;Living will  No, copy requested from family  --  --  --    20 0747  Yes, patient has an advance directive for healthcare treatment  --  Yes, copy in chart  --  --  --    20 0862  Unknown, patient unable to respond due to medical condition  --  --  --  --  --          Admitting Physician:  Rosie Alvarez MD  PCP: Vee Rivas    Discharging Nurse: [place] Unit/Room#: 0328/0328-01  Discharging Unit Phone Number: [phone]    Emergency Contact:   Extended Emergency Contact Information  Primary Emergency Contact: Debra Meyers  [place] Phone: [phone]  Relation: Child    Past Surgical History:  Past Surgical History:   Procedure Laterality Date    APPENDECTOMY      CATARACT REMOVAL Bilateral     COLONOSCOPY      ENDOSCOPY, COLON, DIAGNOSTIC      HERNIA REPAIR      HIP ARTHROPLASTY Right 2020    HYSTERECTOMY      OVARY REMOVAL Bilateral     TONSILLECTOMY         Immunization History: There is no immunization history on file for this patient.     Active Problems:  Patient Active Problem List   Diagnosis Code    Primary osteoarthritis of right hip M16.11    Mild malnutrition ([facility] [address] E44.1    Hypertension I10    Heart disease I51.9       Isolation/Infection:   Isolation          No Isolation        Patient Infection Status     None to display          Nurse Assessment:  Last Vital Signs: BP (!) 128/45   Pulse 80   Temp 97.6 °F (36.4 °C) (Temporal)   Resp 18   Ht 5' 3\" (1.6 m)   Wt 162 lb (73.5 kg)   SpO2 96%   BMI 28.70 kg/m²     Last documented pain score (0-10 scale): Pain Level: 6  Last Weight:   Wt Readings from Last 1 Encounters:   11/18/20 162 lb (73.5 kg)     Mental Status:  oriented and alert    IV Access:  - None    Nursing Mobility/ADLs:  Walking   Assisted  Transfer  Assisted  Bathing  Assisted  Dressing  Assisted  Toileting  Assisted  Feeding  Independent  Med Admin  Assisted  Med Delivery   whole    Wound Care Documentation and Therapy:        Elimination:  Continence:   · Bowel: Yes  · Bladder: Yes  Urinary Catheter: None   Colostomy/Ileostomy/Ileal Conduit: No       Date of Last BM: 11/16/2020    Intake/Output Summary (Last 24 hours) at 11/18/2020 1416  Last data filed at 11/18/2020 1142  Gross per 24 hour   Intake 2486 ml   Output 200 ml   Net 2286 ml     I/O last 3 completed shifts: In: 2932 [P.O.:300; I.V.:2632]  Out: 200 [Urine:200]    Safety Concerns: At Risk for Falls    Impairments/Disabilities:      Vision    Nutrition Therapy:  Current Nutrition Therapy:   - Oral Diet:  General    Routes of Feeding: Oral  Liquids: Thin Liquids  Daily Fluid Restriction: no  Last Modified Barium Swallow with Video (Video Swallowing Test): not done    Treatments at the Time of Hospital Discharge:   Respiratory Treatments: n/a  Oxygen Therapy:  is not on home oxygen therapy. Ventilator:    - No ventilator support    Rehab Therapies: Physical Therapy and Occupational Therapy  Weight Bearing Status/Restrictions: No weight bearing restirctions  Other Medical Equipment (for information only, NOT a DME order):  walker  Other Treatments: n/a    Patient's personal belongings (please select all that are sent with patient):  Glasses, Dentures upper    RN SIGNATURE:  Electronically signed by Pako Johnston RN on 11/18/20 at 12:55 PM EST    CASE MANAGEMENT/SOCIAL WORK SECTION    Inpatient Status Date: 11/17/2020  Readmission Risk Assessment

## 2020-11-17 NOTE — PROGRESS NOTES
Ovary removal (Bilateral); and Hip Arthroplasty (Right, 11/17/2020). Restrictions  Restrictions/Precautions  Restrictions/Precautions: Weight Bearing, General Precautions, Fall Risk  Lower Extremity Weight Bearing Restrictions  Right Lower Extremity Weight Bearing: Weight Bearing As Tolerated  Vision/Hearing  Vision: Impaired  Vision Exceptions: Wears glasses at all times  Hearing: Within functional limits     Subjective  General  Patient assessed for rehabilitation services?: Yes  Subjective  Subjective: Pt agrees to PT eval at this time, but states she is \"a little woozy\". Pain Screening  Patient Currently in Pain: Denies          Orientation  Orientation  Overall Orientation Status: Within Functional Limits  Social/Functional History  Social/Functional History  Lives With: Alone  Type of Home: (Condo)  Home Layout: One level  Home Access: Level entry  Home Equipment: Rolling walker  ADL Assistance: Independent  Homemaking Assistance: Needs assistance  Active : Yes  Cognition   Cognition  Overall Cognitive Status: WFL    Objective          AROM RLE (degrees)  RLE AROM: WFL  RLE General AROM: R hip grossly limited d/t pain/weakness. AROM LLE (degrees)  LLE AROM : WFL  Strength RLE  Comment: Grossly 3-/5  Strength LLE  Comment: Grossly 3/5        Bed mobility  Rolling to Left: Moderate assistance  Supine to Sit: Moderate assistance  Sit to Supine: Moderate assistance  Scooting:  Moderate assistance     Ambulation  Ambulation?: No     Balance  Sitting - Static: Poor  Sitting - Dynamic: Poor        Plan   Plan  Times per week: 7 days per week  Times per day: Twice a day(Daily on weekends)  Current Treatment Recommendations: Strengthening, Functional Mobility Training, IADL Training, Neuromuscular Re-education, Home Exercise Program, ROM, Transfer Training, Gait Training, Safety Education & Training, Balance Training, ADL/Self-care Training, Endurance Training, Patient/Caregiver Education & Training  Safety Devices  Type of devices: All fall risk precautions in place                                                  AM-PAC Score     AM-PAC Inpatient Mobility without Stair Climbing Raw Score : 7 (11/17/20 1443)  AM-PAC Inpatient without Stair Climbing T-Scale Score : 28.66 (11/17/20 1443)  Mobility Inpatient CMS 0-100% Score: 86.29 (11/17/20 1443)  Mobility Inpatient without Stair CMS G-Code Modifier : CM (11/17/20 1443)       Goals  Short term goals  Time Frame for Short term goals: 10 days  Short term goal 1: Pt will perform bed mobility with CGA to improve functional independence. Short term goal 2: Pt will be reassessed for transfers and gait when appropriate in order to progress functional mobility. Short term goal 3: Pt will tolerate 20-30mins ther ex/act to improve endurance for ADLs and functional tasks.        Therapy Time   Individual Concurrent Group Co-treatment   Time In 0084         Time Out 1415         Minutes 22                 Adiel Martin, KACIE

## 2020-11-18 VITALS
DIASTOLIC BLOOD PRESSURE: 45 MMHG | TEMPERATURE: 97.6 F | HEART RATE: 80 BPM | HEIGHT: 63 IN | SYSTOLIC BLOOD PRESSURE: 128 MMHG | BODY MASS INDEX: 28.7 KG/M2 | OXYGEN SATURATION: 96 % | WEIGHT: 162 LBS | RESPIRATION RATE: 18 BRPM

## 2020-11-18 PROBLEM — E44.1 MILD MALNUTRITION (HCC): Status: ACTIVE | Noted: 2020-11-18

## 2020-11-18 LAB
ANION GAP SERPL CALCULATED.3IONS-SCNC: 10 MMOL/L (ref 9–17)
BUN BLDV-MCNC: 40 MG/DL (ref 8–23)
BUN/CREAT BLD: 43 (ref 9–20)
CALCIUM SERPL-MCNC: 8.4 MG/DL (ref 8.6–10.4)
CHLORIDE BLD-SCNC: 106 MMOL/L (ref 98–107)
CO2: 24 MMOL/L (ref 20–31)
CREAT SERPL-MCNC: 0.93 MG/DL (ref 0.5–0.9)
GFR AFRICAN AMERICAN: >60 ML/MIN
GFR NON-AFRICAN AMERICAN: 57 ML/MIN
GFR SERPL CREATININE-BSD FRML MDRD: ABNORMAL ML/MIN/{1.73_M2}
GFR SERPL CREATININE-BSD FRML MDRD: ABNORMAL ML/MIN/{1.73_M2}
GLUCOSE BLD-MCNC: 132 MG/DL (ref 70–99)
HCT VFR BLD CALC: 27.9 % (ref 36.3–47.1)
HEMOGLOBIN: 8.8 G/DL (ref 11.9–15.1)
MCH RBC QN AUTO: 32 PG (ref 25.2–33.5)
MCHC RBC AUTO-ENTMCNC: 31.5 G/DL (ref 28.4–34.8)
MCV RBC AUTO: 101.5 FL (ref 82.6–102.9)
NRBC AUTOMATED: 0 PER 100 WBC
PDW BLD-RTO: 13.2 % (ref 11.8–14.4)
PLATELET # BLD: 126 K/UL (ref 138–453)
PMV BLD AUTO: 12.1 FL (ref 8.1–13.5)
POTASSIUM SERPL-SCNC: 4.4 MMOL/L (ref 3.7–5.3)
RBC # BLD: 2.75 M/UL (ref 3.95–5.11)
SODIUM BLD-SCNC: 140 MMOL/L (ref 135–144)
WBC # BLD: 9.5 K/UL (ref 3.5–11.3)

## 2020-11-18 PROCEDURE — 85027 COMPLETE CBC AUTOMATED: CPT

## 2020-11-18 PROCEDURE — 2500000003 HC RX 250 WO HCPCS: Performed by: ORTHOPAEDIC SURGERY

## 2020-11-18 PROCEDURE — 97116 GAIT TRAINING THERAPY: CPT

## 2020-11-18 PROCEDURE — 97530 THERAPEUTIC ACTIVITIES: CPT

## 2020-11-18 PROCEDURE — 97110 THERAPEUTIC EXERCISES: CPT

## 2020-11-18 PROCEDURE — 97535 SELF CARE MNGMENT TRAINING: CPT

## 2020-11-18 PROCEDURE — 36415 COLL VENOUS BLD VENIPUNCTURE: CPT

## 2020-11-18 PROCEDURE — 6370000000 HC RX 637 (ALT 250 FOR IP): Performed by: ORTHOPAEDIC SURGERY

## 2020-11-18 PROCEDURE — 80048 BASIC METABOLIC PNL TOTAL CA: CPT

## 2020-11-18 RX ORDER — TRAMADOL HYDROCHLORIDE 50 MG/1
50 TABLET ORAL EVERY 4 HOURS PRN
Qty: 42 TABLET | Refills: 0 | Status: SHIPPED | OUTPATIENT
Start: 2020-11-18 | End: 2020-11-25

## 2020-11-18 RX ADMIN — LEVOTHYROXINE SODIUM 50 MCG: 50 TABLET ORAL at 07:10

## 2020-11-18 RX ADMIN — ACETAMINOPHEN 650 MG: 325 TABLET, FILM COATED ORAL at 07:11

## 2020-11-18 RX ADMIN — CILOSTAZOL 50 MG: 50 TABLET ORAL at 09:32

## 2020-11-18 RX ADMIN — FUROSEMIDE 40 MG: 40 TABLET ORAL at 09:31

## 2020-11-18 RX ADMIN — GABAPENTIN 100 MG: 100 CAPSULE ORAL at 09:31

## 2020-11-18 RX ADMIN — CLINDAMYCIN PHOSPHATE 900 MG: 900 INJECTION, SOLUTION INTRAVENOUS at 01:29

## 2020-11-18 RX ADMIN — ACETAMINOPHEN 650 MG: 325 TABLET, FILM COATED ORAL at 13:43

## 2020-11-18 RX ADMIN — GABAPENTIN 100 MG: 100 CAPSULE ORAL at 13:43

## 2020-11-18 RX ADMIN — CARVEDILOL 6.25 MG: 6.25 TABLET, FILM COATED ORAL at 09:32

## 2020-11-18 RX ADMIN — TRAMADOL HYDROCHLORIDE 50 MG: 50 TABLET, COATED ORAL at 15:23

## 2020-11-18 RX ADMIN — APIXABAN 5 MG: 5 TABLET, FILM COATED ORAL at 09:31

## 2020-11-18 RX ADMIN — ACETAMINOPHEN 650 MG: 325 TABLET, FILM COATED ORAL at 01:29

## 2020-11-18 RX ADMIN — POTASSIUM CHLORIDE 20 MEQ: 750 TABLET, EXTENDED RELEASE ORAL at 09:31

## 2020-11-18 RX ADMIN — OXYCODONE HYDROCHLORIDE AND ACETAMINOPHEN 1000 MG: 500 TABLET ORAL at 09:31

## 2020-11-18 RX ADMIN — MELOXICAM 7.5 MG: 7.5 TABLET ORAL at 09:32

## 2020-11-18 ASSESSMENT — PAIN SCALES - GENERAL
PAINLEVEL_OUTOF10: 5
PAINLEVEL_OUTOF10: 0
PAINLEVEL_OUTOF10: 6
PAINLEVEL_OUTOF10: 0

## 2020-11-18 NOTE — PLAN OF CARE
Problem: Skin Integrity:  Goal: Will show no infection signs and symptoms  Description: Will show no infection signs and symptoms  Outcome: Ongoing  Note:   Monitor lab work. IV antibiotics per orders. Will continue to monitor. Problem: Skin Integrity:  Goal: Absence of new skin breakdown  Description: Absence of new skin breakdown  Outcome: Ongoing  Note: Richard scale monitoring per protocol. Inspect skin for breakdown frequently. Encourage pt to make frequent large adjustments in position or assist patient with turning. Document all areas of breakdown. Problem: DAILY CARE  Goal: Daily care needs are met  Outcome: Ongoing  Note: Needs assessed hourly, pt alert and oriented able to express needs or meet needs independently. Problem: Pain - Acute:  Goal: Pain level will decrease  Description: Pain level will decrease  Outcome: Ongoing  Note: Pain assessed every four hours and as needed using 0-10 pain scale. Pt educated on scale and uses scale appropriately. Encourage pt to notify staff of pain before pain becomes uncontrollable. Correlate periods of heavy activity after pain medication administration. Use pharmacological and non pharmacological methods for pain relief such as: warm blankets, ice, television, reading, or rest.       Problem: Pain:  Goal: Pain level will decrease  Description: Pain level will decrease  Outcome: Ongoing  Note: Pain assessed every four hours and as needed using 0-10 pain scale. Pt educated on scale and uses scale appropriately. Encourage pt to notify staff of pain before pain becomes uncontrollable. Correlate periods of heavy activity after pain medication administration.  Use pharmacological and non pharmacological methods for pain relief such as: warm blankets, ice, television, reading, or rest.

## 2020-11-18 NOTE — PROGRESS NOTES
Comprehensive Nutrition Assessment    Type and Reason for Visit:  Initial(Nutrition screen)    Nutrition Recommendations/Plan:   1. Continue General diet  2. Suggest add 4 oz Ensure Enlive BID  3. Protein at each meal, calcium containing foods (milk)    Nutrition Assessment:  Increased nutrient needs(protein) related to acute injury/trauma as evidenced by (R hip arthroplasty. Pt reports a good appetite, admitted for hip surgery. Normal weight for pt is 152#, currently 162#, lower extremity edema is present. Pt currently consuming ~80% of meals, ordering small meals. Labs reviewed. Blood sugar is acutely elevated. Pt is receptive to taking 4 oz Ensure Enlive to supplement for surgical healing. Note pt with advanced age, and has mild loss of musculature, subcutaneous fat. Pt is at moderate nutrition risk, and meets criteria for mild malnutrition. Malnutrition Assessment:  Malnutrition Status:  Mild malnutrition    Context:  Acute Illness     Findings of the 6 clinical characteristics of malnutrition:  Energy Intake:  No significant decrease in energy intake  Weight Loss:  No significant weight loss     Body Fat Loss:  1 - Mild body fat loss Orbital   Muscle Mass Loss:  1 - Mild muscle mass loss Hand (interosseous)  Fluid Accumulation:  7 - Moderate to Severe Ascites   Strength:  Not Performed    Estimated Daily Nutrient Needs:  Energy (kcal):  3251-8656. 5(20-25); Weight Used for Energy Requirements:  Current     Protein (g):  63-73(1.2-1.4); Weight Used for Protein Requirements:  Ideal        Fluid (ml/day):  1800+; Method Used for Fluid Requirements:  1 ml/kcal      Nutrition Related Findings:  appears potentially undernourished, edema in lower extremities +2 pitting RLE, +1 pitting LLE      Wounds:  Surgical Incision       Current Nutrition Therapies:    DIET GENERAL;     Anthropometric Measures:  · Height: 5' 3\" (160 cm)  · Current Body Weight: 162 lb (73.5 kg)   · Admission Body Weight: 152 lb (68.9 kg) · Usual Body Weight: 162 lb (73.5 kg)     · Ideal Body Weight: 115 lbs; % Ideal Body Weight 140.9 %   · BMI: 28.7  · BMI Categories: Overweight (BMI 25.0-29. 9)       Nutrition Diagnosis:   · Increased nutrient needs(protein) related to acute injury/trauma as evidenced by (R hip arthroplasty)      Nutrition Interventions:   Food and/or Nutrient Delivery:  Continue Current Diet, Start Oral Nutrition Supplement  Nutrition Education/Counseling:  Education not indicated   Coordination of Nutrition Care:  Continue to monitor while inpatient    Goals:  PO > 75%, 75% of ONS       Nutrition Monitoring and Evaluation:   Behavioral-Environmental Outcomes:  None Identified   Food/Nutrient Intake Outcomes:  Diet Advancement/Tolerance, Food and Nutrient Intake  Physical Signs/Symptoms Outcomes:        Discharge Planning:    Continue current diet     Electronically signed by Ankit Butler RD, LD on 11/18/20 at 9:53 AM EST    Contact:[phone]

## 2020-11-18 NOTE — PROGRESS NOTES
Physical Therapy  Facility/Department: [facility] AT THE [facility] MED SURG  Daily Treatment Note  NAME: Judy Ross  : 1930  MRN: [id number]    Date of Service: 2020    Discharge Recommendations:  Continue to assess pending progress, Subacute/Skilled Nursing Facility, ECF with PT, Patient would benefit from continued therapy after discharge        Assessment   Treatment Diagnosis: generalized weakness  Prognosis: Good  Decision Making: Medium Complexity  Patient Education: Issued and educated pt on discharge folder. pt with approrpiate understanding. REQUIRES PT FOLLOW UP: Yes  Activity Tolerance  Activity Tolerance: Patient Tolerated treatment well     Patient Diagnosis(es): The encounter diagnosis was Post-operative pain. has a past medical history of Anxiety, Arthritis, Heart attack ([facility] [address] Heart disease, Hyperlipidemia, Hypertension, Radiculopathy, and Thyroid disease. has a past surgical history that includes Tonsillectomy; Appendectomy; hernia repair; Colonoscopy; Endoscopy, colon, diagnostic; Hysterectomy; Cataract removal (Bilateral); Ovary removal (Bilateral); and Hip Arthroplasty (Right, 2020). Restrictions  Restrictions/Precautions  Restrictions/Precautions: Weight Bearing, General Precautions, Fall Risk  Lower Extremity Weight Bearing Restrictions  Right Lower Extremity Weight Bearing: Weight Bearing As Tolerated  Subjective   General  Chart Reviewed: Yes  Response To Previous Treatment: Patient with no complaints from previous session. Family / Caregiver Present: No  Subjective  Subjective: Pt reports low pain at 1-2/10 this am. Reports no dizziness and feeling much better.           Orientation  Orientation  Overall Orientation Status: Within Functional Limits  Cognition      Objective                  Exercises  Straight Leg Raise: x15 with AAROM  Quad Sets: x15  Heelslides: x15  Hip Abduction: x15  Knee Long Arc Quad: x15  Knee Short Arc Quad: x15  Ankle Pumps: 15x2  Comments: seated marching x15. Completed LE ther ex seated and reclined                        G-Code     OutComes Score                                                     AM-PAC Score             Goals  Short term goals  Time Frame for Short term goals: 10 days  Short term goal 1: Pt will perform bed mobility with CGA to improve functional independence. Short term goal 2: Pt will be reassessed for transfers and gait when appropriate in order to progress functional mobility. Short term goal 3: Pt will tolerate 20-30mins ther ex/act to improve endurance for ADLs and functional tasks. Plan    Plan  Times per week: 7 days per week  Times per day: Twice a day(Daily on weekends)  Current Treatment Recommendations: Strengthening, Functional Mobility Training, IADL Training, Neuromuscular Re-education, Home Exercise Program, ROM, Transfer Training, Gait Training, Safety Education & Training, Balance Training, ADL/Self-care Training, Endurance Training, Patient/Caregiver Education & Training  Safety Devices  Type of devices:  All fall risk precautions in place, Call light within reach, Left in chair, Nurse notified     Therapy Time   Individual Concurrent Group Co-treatment   Time In [address] [place] [address]         Time Out [address]         Minutes 116 [facility], [id number]

## 2020-11-18 NOTE — PROGRESS NOTES
Patient discharged from facility with son, transports via w/c. Son with transport patient to [facility] by private auto.    Report called to Reba nurse at [facility]

## 2020-11-18 NOTE — PROGRESS NOTES
Updated [facility] on discharge plan and possible returning later today.   JOI Borges

## 2020-11-18 NOTE — PROGRESS NOTES
Physical Therapy    Facility/Department: [facility] AT THE [facility] MED SURG  Reassessment    NAME: Sunny Baumann  : 1930  MRN: [id number]    Date of Service: 2020    Discharge Recommendations:  Continue to assess pending progress, Subacute/Skilled Nursing Facility, ECF with PT, Patient would benefit from continued therapy after discharge        Assessment   Assessment: Pt was reassessed this morning for transfers and gait. Pt performs transfers with Pinky x1, with VC for safety and sequencing. Pt ambulates 10ft with RW and CGA, with decreased step length and decreased ray. Pt denies dizziness, only reports feeling \"shaky\" with transfers and gait. Pt will cont to benefit from skilled PT services to improve strength and overall functional mobility. Pt's POC was updated with new goals at this time. Treatment Diagnosis: generalized weakness  Prognosis: Good  PT Education: Plan of Care;Transfer Training;General Safety;Gait Training  Activity Tolerance  Activity Tolerance: Patient Tolerated treatment well       Patient Diagnosis(es): The encounter diagnosis was Post-operative pain. has a past medical history of Anxiety, Arthritis, Heart attack ([address] Heart disease, Hyperlipidemia, Hypertension, Radiculopathy, and Thyroid disease. has a past surgical history that includes Tonsillectomy; Appendectomy; hernia repair; Colonoscopy; Endoscopy, colon, diagnostic; Hysterectomy; Cataract removal (Bilateral); Ovary removal (Bilateral); and Hip Arthroplasty (Right, 2020).     Restrictions  Restrictions/Precautions  Restrictions/Precautions: Weight Bearing, General Precautions, Fall Risk  Lower Extremity Weight Bearing Restrictions  Right Lower Extremity Weight Bearing: Weight Bearing As Tolerated  Vision/Hearing  Vision: Impaired  Vision Exceptions: Wears glasses at all times  Hearing: Exceptions to [facility]  Hearing Exceptions: Hard of hearing/hearing concerns     Subjective  General  Chart Reviewed: Yes  Patient assessed for rehabilitation services?: Yes  Response To Previous Treatment: Patient with no complaints from previous session. Family / Caregiver Present: No  Subjective  Subjective: Pt states pain is minimal upon arrival to pt's room. Pain Screening  Patient Currently in Pain: Denies          Orientation  Orientation  Overall Orientation Status: Within Functional Limits  Social/Functional History  Social/Functional History  Lives With: Alone  Type of Home: (Condo)  Home Layout: One level  Home Access: Level entry  Home Equipment: Rolling walker  Receives Help From: Personal care attendant  ADL Assistance: Independent  Homemaking Assistance: Needs assistance  Ambulation Assistance: Independent  Transfer Assistance: Independent  Active : Yes  Cognition   Cognition  Overall Cognitive Status: WFL    Objective          AROM RLE (degrees)  RLE General AROM: R hip grossly limited d/t pain/weakness. Knee and ankle WFL. AROM LLE (degrees)  LLE AROM : WFL  Strength RLE  Comment: Grossly 3+/5. R hip not formally assessed with MMT.   Strength LLE  Comment: Grossly 4-/5        Bed mobility  Bridging: (Pt seated in chair upon arrival to pt's room.)  Transfers  Sit to Stand: Minimal Assistance  Stand to sit: Minimal Assistance  Ambulation  Ambulation?: Yes  Ambulation 1  Surface: level tile  Device: Rolling Walker  Assistance: Contact guard assistance  Gait Deviations: Slow Ligia;Decreased step length  Distance: 10ft  Stairs/Curb  Stairs?: No     Balance  Sitting - Static: Good  Sitting - Dynamic: Good  Standing - Static: Fair;-  Standing - Dynamic: Fair;-        Plan   Plan  Times per week: 7 days per week  Times per day: Twice a day(Daily on weekends)  Current Treatment Recommendations: Strengthening, Functional Mobility Training, IADL Training, Neuromuscular Re-education, Home Exercise Program, ROM, Transfer Training, Gait Training, Safety Education & Training, Balance Training, ADL/Self-care Training, Endurance Training, Patient/Caregiver Education & Training  Safety Devices  Type of devices: All fall risk precautions in place                                                  AM-PAC Score     AM-PAC Inpatient Mobility without Stair Climbing Raw Score : 11 (11/18/20 0815)  AM-PAC Inpatient without Stair Climbing T-Scale Score : 35.66 (11/18/20 0815)  Mobility Inpatient CMS 0-100% Score: 67.36 (11/18/20 0815)  Mobility Inpatient without Stair CMS G-Code Modifier : CL (11/18/20 0815)       Goals  Short term goals  Time Frame for Short term goals: 10 days  Short term goal 1: Pt will perform bed mobility with CGA to improve functional independence. Short term goal 2: Pt will be reassessed for transfers and gait when appropriate in order to progress functional mobility. Short term goal 3: Pt will tolerate 20-30mins ther ex/act to improve endurance for ADLs and functional tasks. Short term goal 4: NEW GOAL: Pt will ambulate 50ft with RW and CGA to improve functional ambulation.        Therapy Time   Individual Concurrent Group Co-treatment   Time In 9908         Time Out 0805         Minutes 10                 Jp Magallon, [address] [facility], NELLIE

## 2020-11-18 NOTE — CONSULTS
Hospitalist Consult Note      Requesting Physician:  Dr. Christina Lewis     Reason for consultation: Medical Management     SUBJECTIVE:    History of Present Illness: The patient is a 80 y.o. female who underwent an elective right total Hip replacement by Dr. Christina Lewis for degenerative arthritis and pain which was uncontrolled with outpatient conservative management. Post-op course thus far has been uncomplicated. Her pain is fairly well controlled post operatively. She denies nausea and vomiting post op. She denies any chest pain, palpitations or shortness of breath. Past Medical History:        Diagnosis Date    Anxiety     Arthritis     Heart attack ([address]     Heart disease     Hyperlipidemia     Hypertension     Radiculopathy     Thyroid disease        Past Surgical History:        Procedure Laterality Date    APPENDECTOMY      CATARACT REMOVAL Bilateral     COLONOSCOPY      ENDOSCOPY, COLON, DIAGNOSTIC      HERNIA REPAIR      HIP ARTHROPLASTY Right 11/17/2020    HYSTERECTOMY      OVARY REMOVAL Bilateral     TONSILLECTOMY         Medications Prior to Admission:    Prior to Admission medications    Medication Sig Start Date End Date Taking? Authorizing Provider   traMADol (ULTRAM) 50 MG tablet Take 1 tablet by mouth every 4 hours as needed for Pain for up to 7 days. Intended supply: 7 days. Take lowest dose possible to manage pain 11/18/20 11/25/20 Yes Marquita Espino, APRN - CNP   furosemide (LASIX) 40 MG tablet Take 40 mg by mouth 2 times daily   Yes Historical Provider, MD   potassium chloride (KLOR-CON) 20 MEQ packet Take 20 mEq by mouth 2 times daily   Yes Historical Provider, MD   carvedilol (COREG) 6.25 MG tablet Take 6.25 mg by mouth 2 times daily (with meals)   Yes Historical Provider, MD   atorvastatin (LIPITOR) 80 MG tablet Take 80 mg by mouth nightly   Yes Historical Provider, MD   ALPRAZolam (XANAX) 0.25 MG tablet Take 0.25 mg by mouth nightly as needed for Sleep.    Yes Historical Provider, MD   amLODIPine (NORVASC) 10 MG tablet Take 10 mg by mouth daily   Yes Historical Provider, MD   Ascorbic Acid (VITAMIN C) 1000 MG tablet Take 1,000 mg by mouth daily   Yes Historical Provider, MD   docusate sodium (COLACE) 100 MG capsule Take 100 mg by mouth nightly   Yes Historical Provider, MD   apixaban (ELIQUIS) 5 MG TABS tablet Take by mouth 2 times daily   Yes Historical Provider, MD   gabapentin (NEURONTIN) 100 MG capsule Take 100 mg by mouth 3 times daily. Yes Historical Provider, MD   cephALEXin (KEFLEX) 500 MG capsule Take 500 mg by mouth 4 times daily   Yes Historical Provider, MD   levothyroxine (SYNTHROID) 50 MCG tablet Take 50 mcg by mouth Daily   Yes Historical Provider, MD   ondansetron (ZOFRAN) 4 MG tablet Take 4 mg by mouth every 8 hours as needed for Nausea or Vomiting   Yes Historical Provider, MD   meloxicam (MOBIC) 7.5 MG tablet Take 7.5 mg by mouth daily   Yes Historical Provider, MD   cilostazol (PLETAL) 50 MG tablet Take 50 mg by mouth daily   Yes Historical Provider, MD   hydrocortisone 1 % lotion Apply topically 2 times daily Apply topically 2 times daily. Historical Provider, MD       Allergies:  Niacin and related and Pcn [penicillins]    Social History:   TOBACCO:   reports that she has never smoked. She has never used smokeless tobacco.  ETOH:   reports previous alcohol use. Family History:   History reviewed. No pertinent family history. OBJECTIVE:    Vitals:  Temp: 97.6 °F (36.4 °C)  BP: (!) 112/47  Resp: 16  Pulse: 69  SpO2: 96 %  24HR INTAKE/OUTPUT:      Intake/Output Summary (Last 24 hours) at 11/18/2020 1238  Last data filed at 11/18/2020 1142  Gross per 24 hour   Intake 2486 ml   Output 200 ml   Net 2286 ml     -----------------------------------------------------------------    Review of Systems:  Constitutional:negative  for fevers, and negative for chills.   Eyes: negative for visual disturbance   ENT: negative for sore throat, negative nasal congestion, and negative for earache  Respiratory: negative for shortness of breath, negative for cough, and negative for wheezing  Cardiovascular: negative for chest pain, negative for palpitations, and negative for syncope  Gastrointestinal: negative for abdominal pain, negative for nausea,negative for vomiting, negative for diarrhea, negative for constipation, and negative for hematochezia or melena  Genitourinary: negative for dysuria, negative for urinary urgency, negative for urinary frequency, and negative for hematuria  Skin: negative for skin rash, and negative for skin lesions  Neurological: negative for unilateral weakness, numbness or tingling. Exam:  GEN:    Awake, alert and oriented x 3. no acute distress  NECK:  Supple. normal  CVS:    RRR, no murmur, rub or gallop  PULM:  CTA, no wheezes, rales or rhonchi  ABD:    Bowels sounds normal.  Abdomen is soft. No distention. No tenderness. EXT:   no  edema. Pedal pulses are intact. No calf tenderness  NEURO: Motor and sensory are intact  SKIN:  Incision is clean, dry and intact. No surrounding erythema.  -----------------------------------------------------------------  Diagnostic Data:    Lab Results   Component Value Date    WBC 9.5 11/18/2020    HGB 8.8 (L) 11/18/2020    .5 11/18/2020     (L) 11/18/2020      Lab Results   Component Value Date    GLUCOSE 132 (H) 11/18/2020    BUN 40 (H) 11/18/2020    CREATININE 0.93 (H) 11/18/2020     11/18/2020    K 4.4 11/18/2020    CALCIUM 8.4 (L) 11/18/2020     11/18/2020    CO2 24 11/18/2020         ASSESSMENT:      · Post-op medical management of:        Principal Problem:    Primary osteoarthritis of right hip  Active Problems:    Mild malnutrition ([address]    Hypertension    Heart disease  Resolved Problems:    * No resolved hospital problems.  *    · s/p right total Hip arthroplasty    PLAN:  · Continue current physical and occupational therapy  · Continue DVT prophylaxis -- Eliquis  · Discharge planning -- home to [facility] for Discharge from Medical-- BP controlled, off oxygen, labs stable, tolerating activity well with assist without dizziness    TAMIKO Loomis, NP-C  11/18/2020, 12:38 PM

## 2020-11-18 NOTE — PLAN OF CARE
Problem: Skin Integrity:  Goal: Will show no infection signs and symptoms  Description: Will show no infection signs and symptoms  11/18/2020 1249 by Girish Ellison RN  Outcome: Ongoing  11/18/2020 0111 by Dolores Hernández  Outcome: Ongoing  Note:   Monitor lab work. IV antibiotics per orders. Will continue to monitor. Goal: Absence of new skin breakdown  Description: Absence of new skin breakdown  11/18/2020 0111 by Dolores Hernández  Outcome: Ongoing  Note: Richard scale monitoring per protocol. Inspect skin for breakdown frequently. Encourage pt to make frequent large adjustments in position or assist patient with turning. Document all areas of breakdown. Problem: SAFETY  Goal: Free from accidental physical injury  Outcome: Ongoing     Problem: DAILY CARE  Goal: Daily care needs are met  11/18/2020 1249 by Girish Ellison RN  Outcome: Ongoing  11/18/2020 0111 by Dolores Hernández  Outcome: Ongoing  Note: Needs assessed hourly, pt alert and oriented able to express needs or meet needs independently. Problem: PAIN  Goal: Patient's pain/discomfort is manageable  Outcome: Ongoing     Problem: SKIN INTEGRITY  Goal: Skin integrity is maintained or improved  Outcome: Ongoing     Problem: KNOWLEDGE DEFICIT  Goal: Patient/S.O. demonstrates understanding of disease process, treatment plan, medications, and discharge instructions. Outcome: Ongoing     Problem: Pain - Acute:  Goal: Pain level will decrease  Description: Pain level will decrease  11/18/2020 0111 by Dolores Hernández  Outcome: Ongoing  Note: Pain assessed every four hours and as needed using 0-10 pain scale. Pt educated on scale and uses scale appropriately. Encourage pt to notify staff of pain before pain becomes uncontrollable. Correlate periods of heavy activity after pain medication administration.  Use pharmacological and non pharmacological methods for pain relief such as: warm blankets, ice, television, reading, or rest.       Problem: Pain:  Goal: Pain level will decrease  Description: Pain level will decrease  11/18/2020 0111 by Hellen Saint  Outcome: Ongoing  Note: Pain assessed every four hours and as needed using 0-10 pain scale. Pt educated on scale and uses scale appropriately. Encourage pt to notify staff of pain before pain becomes uncontrollable. Correlate periods of heavy activity after pain medication administration.  Use pharmacological and non pharmacological methods for pain relief such as: warm blankets, ice, television, reading, or rest.

## 2020-11-18 NOTE — PROGRESS NOTES
Patient assisted with toileting, ambulates to and from bathroom without difficulty. Denies any dizziness or lightheadedness. Up in chair working with PTA at this time.

## 2020-11-18 NOTE — PROGRESS NOTES
Patient is discharge to [facility]. Discharge paper work done and fax to SNF.   JOI Longoria

## 2020-11-18 NOTE — PROGRESS NOTES
Occupational Therapy  Facility/Department: [address] [place] [address] MED SURG  Daily Treatment Note  NAME: Jazmin Catalan  : 1930  MRN: [id number]    Date of Service: 2020    Discharge Recommendations:  Continue to assess pending progress, Subacute/Skilled Nursing Facility    Assessment   OT Education: OT Role;Plan of Care;Precautions; Family Education;Equipment;ADL Adaptive Strategies;Transfer Training  Barriers to Learning: None  Activity Tolerance  Activity Tolerance: Patient Tolerated treatment well  Safety Devices  Safety Devices in place: Yes  Type of devices: Left in bed;Call light within reach         Patient Diagnosis(es): The encounter diagnosis was Post-operative pain. has a past medical history of Anxiety, Arthritis, Heart attack ([facility] [address] Heart disease, Hyperlipidemia, Hypertension, Radiculopathy, and Thyroid disease. has a past surgical history that includes Tonsillectomy; Appendectomy; hernia repair; Colonoscopy; Endoscopy, colon, diagnostic; Hysterectomy; Cataract removal (Bilateral); Ovary removal (Bilateral); and Hip Arthroplasty (Right, 2020). Restrictions  Restrictions/Precautions  Restrictions/Precautions: Weight Bearing, General Precautions, Fall Risk  Lower Extremity Weight Bearing Restrictions  Right Lower Extremity Weight Bearing: Weight Bearing As Tolerated     Subjective   General  Chart Reviewed: Yes  Patient assessed for rehabilitation services?: Yes  Response to previous treatment: Patient with no complaints from previous session  Family / Caregiver Present: Yes  Referring Practitioner: Dr Galina Alonso  Diagnosis: R TC  Subjective  Subjective: Patient reports 3/10 pain in RLE at this time  General Comment  Comments: Patient laying in bed upon OT arrival, agreeable to OT tx    Objective    ADL  Equipment Provided: Reacher;Sock aid  Feeding: Independent  Grooming: Contact guard assistance  UE Bathing: Contact guard assistance;Minimal assistance  LE Bathing:  Moderate assistance  UE Dressing: Contact guard assistance  LE Dressing: Moderate assistance  Toileting: Minimal assistance  Balance  Sitting Balance: Supervision  Standing Balance: Contact guard assistance  Functional Mobility  Functional Mobility Device: Rolling Walker  Activity: To/from bathroom  Assist Level: Contact guard assistance  Toilet Transfers  Toilet Technique: Ambulating  Equipment Used: Grab bars  Toilet Transfer: Contact guard assistance  Bed mobility  Rolling to Left: Minimal assistance  Rolling to Right: Minimal assistance  Supine to Sit: Minimal assistance; Moderate assistance  Sit to Supine: Minimal assistance; Moderate assistance  Scooting: Minimal assistance  Transfers  Stand Pivot Transfers: Contact guard assistance  Sit to stand: Contact guard assistance  Stand to sit: Contact guard assistance    Plan   Plan  Times per week: 7  Times per day: Daily  Current Treatment Recommendations: Strengthening, Patient/Caregiver Education & Training, Balance Training, Functional Mobility Training, Endurance Training, Self-Care / ADL, Safety Education & Training  Plan Comment: ther ex, ther act, self care    Goals  Short term goals  Time Frame for Short term goals: 10 days  Short term goal 1: Patient will perform UB ADLs with S/U using AE PRN in order to return to PLOF  Short term goal 2: Patient will perform LB ADLs with Pinky using AE PRN in order to return to PLOF  Short term goal 3: Patient will perform functional mobility during ADLs with Pinky using LRAD in order to return to PLOF  Short term goal 4: Patient will perform 3 minutes of standing sinkside ADLs without LOB or intolerable pain in order to increase participation with ADLs  Patient Goals   Patient goals :  To get better       Therapy Time   Individual Concurrent Group Co-treatment   Time In 1000         Time Out 1026         Minutes 26         Timed Code Treatment Minutes: Evelina  , [place]

## 2020-11-18 NOTE — PROGRESS NOTES
Physical Therapy  Facility/Department: [facility] MED SURG  Daily Treatment Note  NAME: Aly Parra  : 1930  MRN: [id number]    Date of Service: 2020    Discharge Recommendations:  Continue to assess pending progress, Subacute/Skilled Nursing Facility, ECF with PT, Patient would benefit from continued therapy after discharge        Assessment   Treatment Diagnosis: generalized weakness  Prognosis: Good  Decision Making: Medium Complexity  Patient Education: Educated pt on importance of daily exercise. Pt with good understanding  REQUIRES PT FOLLOW UP: Yes  Activity Tolerance  Activity Tolerance: Patient Tolerated treatment well     Patient Diagnosis(es): The encounter diagnosis was Post-operative pain. has a past medical history of Anxiety, Arthritis, Heart attack ([facility] [address] Heart disease, Hyperlipidemia, Hypertension, Radiculopathy, and Thyroid disease. has a past surgical history that includes Tonsillectomy; Appendectomy; hernia repair; Colonoscopy; Endoscopy, colon, diagnostic; Hysterectomy; Cataract removal (Bilateral); Ovary removal (Bilateral); and Hip Arthroplasty (Right, 2020). Restrictions  Restrictions/Precautions  Restrictions/Precautions: Weight Bearing, General Precautions, Fall Risk  Lower Extremity Weight Bearing Restrictions  Right Lower Extremity Weight Bearing: Weight Bearing As Tolerated  Subjective   General  Chart Reviewed: Yes  Response To Previous Treatment: Patient with no complaints from previous session. Family / Caregiver Present: Yes  Subjective  Subjective: pt reports pain in R hip at 1-2/10. Orientation  Orientation  Overall Orientation Status: Within Functional Limits  Cognition      Objective   Bed mobility  Supine to Sit: Moderate assistance  Scooting:  Moderate assistance  Transfers  Sit to Stand: Contact guard assistance;Minimal Assistance  Stand to sit: Contact guard assistance;Minimal Assistance  Ambulation  Ambulation?: Yes  WB Status: RLE WBAT  Ambulation 1  Surface: level tile  Device: Rolling Walker  Assistance: Contact guard assistance  Quality of Gait: slow cadenxce with FF posture, decreased heel/toe on R  Distance: 10 ft x2     Balance  Sitting - Static: Good  Sitting - Dynamic: Good  Standing - Static: Fair;-  Standing - Dynamic: Fair;-  Exercises  Straight Leg Raise: x15 with AAROM  Quad Sets: x15  Heelslides: x15  Hip Abduction: x15  Knee Long Arc Quad: x15  Knee Short Arc Quad: x15  Ankle Pumps: 15x2  Comments: seated marching x15. Completed LE ther ex seated EOB and supine                        G-Code     OutComes Score                                                     AM-PAC Score             Goals  Short term goals  Time Frame for Short term goals: 10 days  Short term goal 1: Pt will perform bed mobility with CGA to improve functional independence. Short term goal 2: Pt will be reassessed for transfers and gait when appropriate in order to progress functional mobility. Short term goal 3: Pt will tolerate 20-30mins ther ex/act to improve endurance for ADLs and functional tasks. Short term goal 4: NEW GOAL: Pt will ambulate 50ft with RW and CGA to improve functional ambulation. Plan    Plan  Times per week: 7 days per week  Times per day: Twice a day(Daily on weekends)  Current Treatment Recommendations: Strengthening, Functional Mobility Training, IADL Training, Neuromuscular Re-education, Home Exercise Program, ROM, Transfer Training, Gait Training, Safety Education & Training, Balance Training, ADL/Self-care Training, Endurance Training, Patient/Caregiver Education & Training  Safety Devices  Type of devices:  All fall risk precautions in place, Call light within reach, Gait belt, Left in chair, Nurse notified     Therapy Time   Individual Concurrent Group Co-treatment   Time In [address]         Time Out 1124         Minutes [address] [place] [address], [id number]

## 2020-11-18 NOTE — DISCHARGE SUMMARY
ADMISSION DIAGNOSIS:  1.) Right hip primary osteoarthritis. 2.) Right femoral head osteonecrosis     PROCEDURES WHILE INPATIENT:  Right total hip arthroplasty performed on 11/17/2020. HOSPITAL COURSE:  The patient presented to the hospital on day of surgery. Surgery was completed without complications. Following surgery, the patient was admitted to regular nursing floor for postoperative pain control and physical therapy. The patient's pain was controlled with oral and IV medications. Patient had pharmacologic and mechanical DVT prophylaxis. The patient began working with physical therapy and occupational therapy. Case management was consulted for assistance with discharge planning. The patient was deemed safe for discharge and was subsequently discharged following an uncomplicated postoperative course. DISPOSITION:  [facility]     CONDITION UPON DISCHARGE:  Stable. DISCHARGE MEDICATIONS:  The patient will resume home pre-hospital medication regimen. New medications include:   Tramadol 50 mg 1 tablets every 4 hours as needed for pain  Resume Eliquis and Pletal daily. INSTRUCTIONS:  The patient may bear weight as tolerated on the operative extremity. Dressing may be changed as needed. Keep incision dry. FOLLOW UP:  Follow up with Dr. Ben Ferrari in 2 weeks. Follow up with outpatient physical therapy in 2-3 days as scheduled. Follow up with primary care physician within 1 month, or sooner as needed.

## 2020-11-19 LAB — SURGICAL PATHOLOGY REPORT: NORMAL

## 2020-11-21 NOTE — PROGRESS NOTES
Physician Progress Note      PATIENT:               Alonso De Leon  CSN #:                  [id number]  :                       1930  ADMIT DATE:       2020 7:04 AM  [address] DATE:        2020 4:16 PM  RESPONDING  PROVIDER #:        Zia Lamar MD          QUERY TEXT:    Pt admitted for right TC. Pt noted to have post op hemoglobin 8.8;  per   ortho PN: episodes of hypotension    If possible, please document in the progress notes and discharge summary if   you are evaluating and/or treating any of the following: The medical record reflects the following:  Risk Factors:  Advanced age, right TC, surgical  ml  Clinical Indicators: 20 H/H = 8.8 / 27.9 (no previous in Epic);  per    ortho PN: some hypotensive episodes yesterday when attempting to get up   for PT  Treatment: IV fluids, IV albumin intra-op    Fermín Cheek RN, [address]  Clinical   [phone]  . Options provided:  -- Acute blood loss anemia  -- Chronic blood loss anemia  -- Acute on chronic blood loss anemia  -- Iron deficiency anemia  -- Postoperative acute blood loss anemia  -- Dilutional anemia  -- Precipitous drop in Hemoglobin and Hematocrit  -- Other - I will add my own diagnosis  -- Disagree - Not applicable / Not valid  -- Disagree - Clinically unable to determine / Unknown  -- Refer to Clinical Documentation Reviewer    PROVIDER RESPONSE TEXT:    This patient has acute blood loss anemia.     Query created by: Memo Tejada on 2020 9:12 AM      Electronically signed by:  Zia Lamar MD 2020 9:26 AM

## (undated) DEVICE — SHEET, DRAPE, SPLIT, STERILE: Brand: MEDLINE

## (undated) DEVICE — ELECTRODE ES AD CRD L15FT DISP FOR PT BELOW 30LB REM

## (undated) DEVICE — DRAPE C ARM UNIV W41XL74IN CLR PLAS XR VELC CLSR POLY STRP

## (undated) DEVICE — PADDING CAST W6INXL4YD COT LO LINTING WYTEX

## (undated) DEVICE — SUTURE VCRL + SZ 2-0 L36IN ABSRB UD L36MM CT-1 1/2 CIR VCP945H

## (undated) DEVICE — DUAL CUT SAGITTAL BLADE

## (undated) DEVICE — SYRINGE BULB 50/CS 48/PLT: Brand: MEDEGEN MEDICAL PRODUCTS, LLC

## (undated) DEVICE — SUTURE VCRL SZ 0 L36IN ABSRB UD L36MM CT-1 1/2 CIR J946H

## (undated) DEVICE — SOLUTION IV IRRIG POUR BRL 0.9% SODIUM CHL 2F7124

## (undated) DEVICE — SOLUTION IV IRRIG 0.9% NACL 3000ML BAG 2B7477

## (undated) DEVICE — SUTURE VCRL + SZ 2 L27IN ABSRB UD TP-1 L65MM 1/2 CIR TAPR VCP849G

## (undated) DEVICE — COVER,TABLE,HEAVY DUTY,77"X90",STRL: Brand: MEDLINE

## (undated) DEVICE — 3M™ STERI-DRAPE™ U-DRAPE 1015: Brand: STERI-DRAPE™

## (undated) DEVICE — FAN SPRAY KIT: Brand: PULSAVAC®

## (undated) DEVICE — SUTURE STRATAFIX SPRL SZ 1 L14IN ABSRB VLT L48CM CTX 1/2 SXPD2B405

## (undated) DEVICE — HOOD: Brand: FLYTE, SURGICOOL

## (undated) DEVICE — BASIC PACK: Brand: MEDLINE INDUSTRIES, INC.

## (undated) DEVICE — TUBING, SUCTION, 9/32" X 12', STRAIGHT: Brand: MEDLINE INDUSTRIES, INC.

## (undated) DEVICE — SPONGE LAP W18XL18IN WHT COT 4 PLY FLD STRUNG RADPQ DISP ST

## (undated) DEVICE — SHEET,DRAPE,53X77,STERILE: Brand: MEDLINE

## (undated) DEVICE — STERILE PATIENT PROTECTIVE PAD FOR IMP® KNEE POSITIONERS & COHESIVE WRAP (10 / CASE): Brand: DE MAYO KNEE POSITIONER®

## (undated) DEVICE — NEEDLE SPNL 20GA L3.5IN YEL HUB S STL REG WALL FIT STYL W/

## (undated) DEVICE — YANKAUER,FLEXIBLE HANDLE,REGLR CAPACITY: Brand: MEDLINE INDUSTRIES, INC.

## (undated) DEVICE — SUTURE SILK PERMAHAND PRECUT 6 X 30 IN SZ 1 BLK BRAID A307H

## (undated) DEVICE — 450 ML BOTTLE OF 0.05% CHLORHEXIDINE GLUCONATE IN 99.95% STERILE WATER FOR IRRIGATION, USP AND APPLICATOR.: Brand: IRRISEPT ANTIMICROBIAL WOUND LAVAGE

## (undated) DEVICE — PEN: MARKING STD 100/CS: Brand: MEDICAL ACTION INDUSTRIES

## (undated) DEVICE — RENTAL TABLE ATTACHMENT

## (undated) DEVICE — GLOVE SURG SZ 75 L12IN FNGR THK87MIL WHT LTX FREE

## (undated) DEVICE — CHLORAPREP 26ML ORANGE

## (undated) DEVICE — INTENDED FOR TISSUE SEPARATION, AND OTHER PROCEDURES THAT REQUIRE A SHARP SURGICAL BLADE TO PUNCTURE OR CUT.: Brand: BARD-PARKER ® STAINLESS STEEL BLADES

## (undated) DEVICE — PENCIL SMK EVAC L10FT DIA95MM TBNG NONSTICK W ADPT TO 22MM

## (undated) DEVICE — 20 ML SYRINGE LUER-LOCK TIP: Brand: MONOJECT

## (undated) DEVICE — UNDERGLOVE SURG SZ 8 BLU LTX FREE SYN POLYISOPRENE POLYMER

## (undated) DEVICE — 6619 2 PTNT ISO SYS INCISE AREA&LT;(&GT;&&LT;)&GT;P: Brand: STERI-DRAPE™ IOBAN™ 2